# Patient Record
Sex: MALE | Race: WHITE | Employment: OTHER | URBAN - METROPOLITAN AREA
[De-identification: names, ages, dates, MRNs, and addresses within clinical notes are randomized per-mention and may not be internally consistent; named-entity substitution may affect disease eponyms.]

---

## 2017-04-03 PROBLEM — E78.5 HYPERLIPIDEMIA LDL GOAL <100: Status: ACTIVE | Noted: 2017-04-03

## 2017-07-11 ENCOUNTER — HOSPITAL ENCOUNTER (OUTPATIENT)
Dept: LAB | Age: 77
Discharge: HOME | End: 2017-07-11

## 2017-07-11 PROCEDURE — 88305 TISSUE EXAM BY PATHOLOGIST: CPT | Performed by: INTERNAL MEDICINE

## 2018-03-10 ENCOUNTER — HOSPITAL ENCOUNTER (EMERGENCY)
Age: 78
Discharge: HOME | End: 2018-03-10
Attending: EMERGENCY MEDICINE
Payer: MEDICARE

## 2018-03-10 ENCOUNTER — APPOINTMENT (OUTPATIENT)
Dept: GENERAL RADIOLOGY | Age: 78
End: 2018-03-10
Attending: EMERGENCY MEDICINE
Payer: MEDICARE

## 2018-03-10 VITALS
DIASTOLIC BLOOD PRESSURE: 79 MMHG | OXYGEN SATURATION: 96 % | TEMPERATURE: 97.6 F | BODY MASS INDEX: 27.3 KG/M2 | SYSTOLIC BLOOD PRESSURE: 145 MMHG | HEART RATE: 63 BPM | HEIGHT: 71 IN | WEIGHT: 195 LBS | RESPIRATION RATE: 18 BRPM

## 2018-03-10 DIAGNOSIS — R07.9 CHEST PAIN, UNSPECIFIED TYPE: Primary | ICD-10-CM

## 2018-03-10 LAB
ALBUMIN SERPL-MCNC: 4.1 G/DL (ref 3.2–4.6)
ALBUMIN/GLOB SERPL: 1.2 {RATIO} (ref 1.2–3.5)
ALP SERPL-CCNC: 86 U/L (ref 50–136)
ALT SERPL-CCNC: 28 U/L (ref 12–65)
ANION GAP SERPL CALC-SCNC: 8 MMOL/L (ref 7–16)
AST SERPL-CCNC: 24 U/L (ref 15–37)
ATRIAL RATE: 67 BPM
BASOPHILS # BLD: 0 K/UL (ref 0–0.2)
BASOPHILS NFR BLD: 1 % (ref 0–2)
BILIRUB SERPL-MCNC: 0.8 MG/DL (ref 0.2–1.1)
BUN SERPL-MCNC: 24 MG/DL (ref 8–23)
CALCIUM SERPL-MCNC: 9 MG/DL (ref 8.3–10.4)
CALCULATED P AXIS, ECG09: 38 DEGREES
CALCULATED R AXIS, ECG10: 47 DEGREES
CALCULATED T AXIS, ECG11: 58 DEGREES
CHLORIDE SERPL-SCNC: 104 MMOL/L (ref 98–107)
CO2 SERPL-SCNC: 27 MMOL/L (ref 21–32)
CREAT SERPL-MCNC: 1.57 MG/DL (ref 0.8–1.5)
DIAGNOSIS, 93000: NORMAL
DIFFERENTIAL METHOD BLD: ABNORMAL
EOSINOPHIL # BLD: 0.4 K/UL (ref 0–0.8)
EOSINOPHIL NFR BLD: 5 % (ref 0.5–7.8)
ERYTHROCYTE [DISTWIDTH] IN BLOOD BY AUTOMATED COUNT: 13.8 % (ref 11.9–14.6)
GLOBULIN SER CALC-MCNC: 3.5 G/DL (ref 2.3–3.5)
GLUCOSE SERPL-MCNC: 98 MG/DL (ref 65–100)
HCT VFR BLD AUTO: 44.5 % (ref 41.1–50.3)
HGB BLD-MCNC: 15.7 G/DL (ref 13.6–17.2)
IMM GRANULOCYTES # BLD: 0 K/UL (ref 0–0.5)
IMM GRANULOCYTES NFR BLD AUTO: 0 % (ref 0–5)
LYMPHOCYTES # BLD: 2.7 K/UL (ref 0.5–4.6)
LYMPHOCYTES NFR BLD: 34 % (ref 13–44)
MCH RBC QN AUTO: 32.2 PG (ref 26.1–32.9)
MCHC RBC AUTO-ENTMCNC: 35.3 G/DL (ref 31.4–35)
MCV RBC AUTO: 91.4 FL (ref 79.6–97.8)
MONOCYTES # BLD: 0.6 K/UL (ref 0.1–1.3)
MONOCYTES NFR BLD: 8 % (ref 4–12)
NEUTS SEG # BLD: 4.3 K/UL (ref 1.7–8.2)
NEUTS SEG NFR BLD: 52 % (ref 43–78)
P-R INTERVAL, ECG05: 204 MS
PLATELET # BLD AUTO: 189 K/UL (ref 150–450)
PMV BLD AUTO: 11 FL (ref 10.8–14.1)
POTASSIUM SERPL-SCNC: 4.1 MMOL/L (ref 3.5–5.1)
PROT SERPL-MCNC: 7.6 G/DL (ref 6.3–8.2)
Q-T INTERVAL, ECG07: 442 MS
QRS DURATION, ECG06: 136 MS
QTC CALCULATION (BEZET), ECG08: 459 MS
RBC # BLD AUTO: 4.87 M/UL (ref 4.23–5.67)
SODIUM SERPL-SCNC: 139 MMOL/L (ref 136–145)
TROPONIN I BLD-MCNC: 0 NG/ML (ref 0.02–0.05)
TROPONIN I SERPL-MCNC: <0.02 NG/ML (ref 0.02–0.05)
VENTRICULAR RATE, ECG03: 65 BPM
WBC # BLD AUTO: 7.9 K/UL (ref 4.3–11.1)

## 2018-03-10 PROCEDURE — 84484 ASSAY OF TROPONIN QUANT: CPT | Performed by: EMERGENCY MEDICINE

## 2018-03-10 PROCEDURE — 93005 ELECTROCARDIOGRAM TRACING: CPT | Performed by: EMERGENCY MEDICINE

## 2018-03-10 PROCEDURE — 85025 COMPLETE CBC W/AUTO DIFF WBC: CPT | Performed by: EMERGENCY MEDICINE

## 2018-03-10 PROCEDURE — 71046 X-RAY EXAM CHEST 2 VIEWS: CPT

## 2018-03-10 PROCEDURE — 80053 COMPREHEN METABOLIC PANEL: CPT | Performed by: EMERGENCY MEDICINE

## 2018-03-10 PROCEDURE — 99283 EMERGENCY DEPT VISIT LOW MDM: CPT | Performed by: EMERGENCY MEDICINE

## 2018-03-10 NOTE — ED PROVIDER NOTES
HPI Comments: 69-year-old male with history of AAA and coronary artery disease with 2 stents, one most recently placed in December at [facility], presents with constant left-sided chest pain since last night. He states the pain was there when he went to bed and also there when he woke up. No change with movement, exertion, palpation. No cough or shortness of breath. No fever or injury. States it is [place]" in nature. Denies any leg swelling. Took one nitroglycerin without improvement. Has taken aspirin and Plavix this morning. Patient is a 65 y.o. male presenting with chest pain. The history is provided by the patient. Chest Pain (Angina)    Pertinent negatives include no abdominal pain, no back pain, no cough, no fever, no headaches, no nausea, no palpitations, no shortness of breath, no vomiting and no weakness.         Past Medical History:   Diagnosis Date    AAA (abdominal aortic aneurysm) ([facility] [address] 09/12/2016    2.94 cm    Atherosclerotic plaque 09/12/2016    Bilateral Carotid Arteries    BPH with obstruction/lower urinary tract symptoms     CAD (coronary artery disease)     CKD (chronic kidney disease) stage 3, GFR 30-59 ml/min     DDD (degenerative disc disease), cervical     Per MRI    Erectile dysfunction     HTN (hypertension)     Hypercholesterolemia     Left ventricular hypertrophy 09/12/2016    Per ECHO    Peripheral arterial disease ([address] 09/12/2016    Nonobstructive       Past Surgical History:   Procedure Laterality Date    HX HEART CATHETERIZATION  2009    Stent x 1    HX OTHER SURGICAL  1949    Rectal Repair from Accident    HX TONSIL AND ADENOIDECTOMY           Family History:   Problem Relation Age of Onset    Stroke Father 61    Other Brother      Carotid Artery Stenosis    Heart Disease Mother        Social History     Social History    Marital status:      Spouse name: N/A    Number of children: N/A    Years of education: N/A     Occupational History    Not on file. Social History Main Topics    Smoking status: Never Smoker    Smokeless tobacco: Never Used    Alcohol use 1.2 oz/week     2 Cans of beer per week    Drug use: No    Sexual activity: Not on file     Other Topics Concern    Not on file     Social History Narrative         ALLERGIES: Review of patient's allergies indicates no known allergies. Review of Systems   Constitutional: Negative for chills and fever. HENT: Negative for hearing loss. Eyes: Negative for visual disturbance. Respiratory: Negative for cough and shortness of breath. Cardiovascular: Positive for chest pain. Negative for palpitations and leg swelling. Gastrointestinal: Negative for abdominal pain, diarrhea, nausea and vomiting. Musculoskeletal: Positive for arthralgias. Negative for back pain. Skin: Negative for rash. Neurological: Negative for weakness and headaches. Psychiatric/Behavioral: Negative for confusion. Vitals:    03/10/18 1241   BP: 138/77   Pulse: 68   Resp: 18   Temp: 97.6 °F (36.4 °C)   SpO2: 96%   Weight: 88.5 kg (195 lb)   Height: 5' 11\" (1.803 m)            Physical Exam   Constitutional: He appears well-developed and well-nourished. HENT:   Head: Normocephalic and atraumatic. Right Ear: External ear normal.   Left Ear: External ear normal.   Nose: Nose normal.   Mouth/Throat: Oropharynx is clear and moist.   Eyes: Conjunctivae are normal. Pupils are equal, round, and reactive to light. Neck: Normal range of motion. Neck supple. Cardiovascular: Regular rhythm, normal heart sounds and intact distal pulses. Pulmonary/Chest: Effort normal and breath sounds normal. No respiratory distress. He has no wheezes. He exhibits no tenderness. Abdominal: Soft. Bowel sounds are normal. He exhibits no distension. There is no tenderness. Musculoskeletal: Normal range of motion. He exhibits no edema. Neurological: He is alert. Skin: Skin is warm and dry.    Psychiatric: Judgment normal.   Nursing note and vitals reviewed. MDM  Number of Diagnoses or Management Options  Diagnosis management comments: Parts of this document were created using dragon voice recognition software. The chart has been reviewed but errors may still be present. Lungs clear. Well-appearing. Will check serial troponins. 3:11 PM  Second troponin normal.  No change in EKG from prior. Atypical constant pain with 2 normal troponins. Advise follow-up with cardiology. I discussed the results of all labs, procedures, radiographs, and treatments with the patient and available family. Treatment plan is agreed upon and the patient is ready for discharge. Questions about treatment in the ED and differential diagnosis of presenting condition were answered. Patient was given verbal discharge instructions including, but not limited to, importance of returning to the emergency department for any concern of worsening or continued symptoms. Instructions were given to follow up with a primary care provider or specialist within 1-2 days. Adverse effects of medications, if prescribed, were discussed and patient was advised to refrain from significant physical activity until followed up by primary care physician and to not drive or operate heavy machinery after taking any sedating substances.            Amount and/or Complexity of Data Reviewed  Clinical lab tests: ordered and reviewed (Results for orders placed or performed during the hospital encounter of 03/10/18  -CBC WITH AUTOMATED DIFF       Result                                            Value                         Ref Range                       WBC                                               7.9                           4.3 - 11.1 K/uL                 RBC                                               4.87                          4.23 - 5.67 M/uL                HGB                                               15.7                          13.6 - 17.2 g/dL                HCT                                               44.5                          41.1 - 50.3 %                   MCV                                               91.4                          79.6 - 97.8 FL                  MCH                                               32.2                          26.1 - 32.9 PG                  MCHC                                              35.3 (H)                      31.4 - 35.0 g/dL                RDW                                               13.8                          11.9 - 14.6 %                   PLATELET                                          189                           150 - 450 K/uL                  MPV                                               11.0                          10.8 - 14.1 FL                  DF                                                AUTOMATED                                                     NEUTROPHILS                                       52                            43 - 78 %                       LYMPHOCYTES                                       34                            13 - 44 %                       MONOCYTES                                         8                             4.0 - 12.0 %                    EOSINOPHILS                                       5                             0.5 - 7.8 %                     BASOPHILS                                         1                             0.0 - 2.0 %                     IMMATURE GRANULOCYTES                             0                             0.0 - 5.0 %                     ABS. NEUTROPHILS                                  4.3                           1.7 - 8.2 K/UL                  ABS. LYMPHOCYTES                                  2.7                           0.5 - 4.6 K/UL                  ABS. MONOCYTES                                    0.6                           0.1 - 1.3 K/UL                  ABS.  EOSINOPHILS 0.4                           0.0 - 0.8 K/UL                  ABS. BASOPHILS                                    0.0                           0.0 - 0.2 K/UL                  ABS. IMM.  GRANS.                                  0.0                           0.0 - 0.5 K/UL             -METABOLIC PANEL, COMPREHENSIVE       Result                                            Value                         Ref Range                       Sodium                                            139                           136 - 145 mmol/L                Potassium                                         4.1                           3.5 - 5.1 mmol/L                Chloride                                          104                           98 - 107 mmol/L                 CO2                                               27                            21 - 32 mmol/L                  Anion gap                                         8                             7 - 16 mmol/L                   Glucose                                           98                            65 - 100 mg/dL                  BUN                                               24 (H)                        8 - 23 MG/DL                    Creatinine                                        1.57 (H)                      0.8 - 1.5 MG/DL                 GFR est AA                                        55 (L)                        >60 ml/min/1.73m2               GFR est non-AA                                    46 (L)                        >60 ml/min/1.73m2               Calcium                                           9.0                           8.3 - 10.4 MG/DL                Bilirubin, total                                  0.8                           0.2 - 1.1 MG/DL                 ALT (SGPT)                                        28                            12 - 65 U/L                     AST (SGOT) 24                            15 - 37 U/L                     Alk. phosphatase                                  86                            50 - 136 U/L                    Protein, total                                    7.6                           6.3 - 8.2 g/dL                  Albumin                                           4.1                           3.2 - 4.6 g/dL                  Globulin                                          3.5                           2.3 - 3.5 g/dL                  A-G Ratio                                         1.2                           1.2 - 3.5                  -TROPONIN I       Result                                            Value                         Ref Range                       Troponin-I, Qt.                                   <0.02 (L)                     0.02 - 0.05 NG/ML          -POC TROPONIN-I       Result                                            Value                         Ref Range                       Troponin-I (POC)                                  0 (L)                         0.02 - 0.05 ng/ml          )  Tests in the radiology section of CPT®: ordered and reviewed (Xr Chest Pa Lat    Result Date: 3/10/2018  Chest X-ray INDICATION:  Acute onset chest pain PA and lateral views of the chest were obtained. FINDINGS: The lungs are clear. There are no infiltrates or effusions. The heart size is normal.  The bony thorax is intact.       IMPRESSION: No acute findings in the chest     )          ED Course       Procedures

## 2018-03-10 NOTE — ED TRIAGE NOTES
Pt states onset of chest pain was last night. Pt states he took a nitro around 11am today. Pt states the nitro made him feel worse. Pt states having a flare of gout on the right ankle.

## 2018-03-10 NOTE — DISCHARGE INSTRUCTIONS
Chest Pain: Care Instructions  Your Care Instructions    There are many things that can cause chest pain. Some are not serious and will get better on their own in a few days. But some kinds of chest pain need more testing and treatment. Your doctor may have recommended a follow-up visit in the next 8 to 12 hours. If you are not getting better, you may need more tests or treatment. Even though your doctor has released you, you still need to watch for any problems. The doctor carefully checked you, but sometimes problems can develop later. If you have new symptoms or if your symptoms do not get better, get medical care right away. If you have worse or different chest pain or pressure that lasts more than 5 minutes or you passed out (lost consciousness), call 911 or seek other emergency help right away. A medical visit is only one step in your treatment. Even if you feel better, you still need to do what your doctor recommends, such as going to all suggested follow-up appointments and taking medicines exactly as directed. This will help you recover and help prevent future problems. How can you care for yourself at home? · Rest until you feel better. · Take your medicine exactly as prescribed. Call your doctor if you think you are having a problem with your medicine. · Do not drive after taking a prescription pain medicine. When should you call for help? Call 911 if:  ? · You passed out (lost consciousness). ? · You have severe difficulty breathing. ? · You have symptoms of a heart attack. These may include:  ¨ Chest pain or pressure, or a strange feeling in your chest.  ¨ Sweating. ¨ Shortness of breath. ¨ Nausea or vomiting. ¨ Pain, pressure, or a strange feeling in your back, neck, jaw, or upper belly or in one or both shoulders or arms. ¨ Lightheadedness or sudden weakness. ¨ A fast or irregular heartbeat.   After you call 911, the  may tell you to chew 1 adult-strength or 2 to 4 low-dose aspirin. Wait for an ambulance. Do not try to drive yourself. ?Call your doctor today if:  ? · You have any trouble breathing. ? · Your chest pain gets worse. ? · You are dizzy or lightheaded, or you feel like you may faint. ? · You are not getting better as expected. ? · You are having new or different chest pain. Where can you learn more? Go to http://taco-usman.info/. Enter A120 in the search box to learn more about \"Chest Pain: Care Instructions. \"  Current as of: March 20, 2017  Content Version: 11.4  © 8825-5981 [employer]. Care instructions adapted under license by [employer] (which disclaims liability or warranty for this information). If you have questions about a medical condition or this instruction, always ask your healthcare professional. [address] any warranty or liability for your use of this information.

## 2019-09-30 PROCEDURE — 88305 TISSUE EXAM BY PATHOLOGIST: CPT

## 2019-10-01 ENCOUNTER — HOSPITAL ENCOUNTER (OUTPATIENT)
Dept: LAB | Age: 79
Discharge: HOME | End: 2019-10-01

## 2019-10-31 ENCOUNTER — HOSPITAL ENCOUNTER (OUTPATIENT)
Dept: MRI IMAGING | Age: 79
Discharge: HOME | End: 2019-10-31
Attending: UROLOGY
Payer: MEDICARE

## 2019-10-31 ENCOUNTER — HOSPITAL ENCOUNTER (OUTPATIENT)
Dept: NUCLEAR MEDICINE | Age: 79
Discharge: HOME | End: 2019-10-31
Attending: UROLOGY
Payer: MEDICARE

## 2019-10-31 DIAGNOSIS — C61 PROSTATE CANCER (HCC): ICD-10-CM

## 2019-10-31 PROCEDURE — 72197 MRI PELVIS W/O & W/DYE: CPT

## 2019-10-31 PROCEDURE — 74011000258 HC RX REV CODE- 258: Performed by: UROLOGY

## 2019-10-31 PROCEDURE — A9575 INJ GADOTERATE MEGLUMI 0.1ML: HCPCS | Performed by: UROLOGY

## 2019-10-31 PROCEDURE — 74011250636 HC RX REV CODE- 250/636: Performed by: UROLOGY

## 2019-10-31 PROCEDURE — 78306 BONE IMAGING WHOLE BODY: CPT

## 2019-10-31 RX ORDER — SODIUM CHLORIDE 0.9 % (FLUSH) 0.9 %
10 SYRINGE (ML) INJECTION
Status: COMPLETED | OUTPATIENT
Start: 2019-10-31 | End: 2019-10-31

## 2019-10-31 RX ORDER — GADOTERATE MEGLUMINE 376.9 MG/ML
17 INJECTION INTRAVENOUS
Status: COMPLETED | OUTPATIENT
Start: 2019-10-31 | End: 2019-10-31

## 2019-10-31 RX ADMIN — Medication 10 ML: at 10:41

## 2019-10-31 RX ADMIN — Medication 10 ML: at 11:27

## 2019-10-31 RX ADMIN — SODIUM CHLORIDE 100 ML: 900 INJECTION, SOLUTION INTRAVENOUS at 11:27

## 2019-10-31 RX ADMIN — GADOTERATE MEGLUMINE 17 ML: 376.9 INJECTION INTRAVENOUS at 11:27

## 2019-11-01 NOTE — PROGRESS NOTES
I reviewed with Dr. Glen Buckner. Please set him up for plain films of lower thoracic/upper lumbar spine early next wk for abnl bone scan.  I reviewed plan with patient

## 2019-11-04 ENCOUNTER — HOSPITAL ENCOUNTER (OUTPATIENT)
Dept: GENERAL RADIOLOGY | Age: 79
Discharge: HOME | End: 2019-11-04
Payer: MEDICARE

## 2019-11-04 DIAGNOSIS — C61 PROSTATE CANCER (HCC): ICD-10-CM

## 2019-11-04 PROCEDURE — 72100 X-RAY EXAM L-S SPINE 2/3 VWS: CPT

## 2019-11-04 PROCEDURE — 72072 X-RAY EXAM THORAC SPINE 3VWS: CPT

## 2019-11-21 PROBLEM — C61 PROSTATE CANCER (HCC): Status: ACTIVE | Noted: 2019-11-21

## 2019-11-21 NOTE — CONSULTS
Patient: Bessy Bailey MRN: [id number]  SSN: [id number]    YOB: 1940  Age: [de-identified] y.o. 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 PSA 9.7 (Density 0.21), [id number]. PREVIOUS TREATMENT:  1)  TRUS Biopsy 9/30/19    HISTORY OF PRESENT ILLNESS:  Bessy Bailey is a [de-identified] y.o. male who I am seeing at the request of Dr. Librado Aguilera for prostate cancer. He was seen and evaluated for an elevated PSA on routine screening. He did have a prior negative biopsy in [place] in 2012. His pertinent past medical history includes Parkinson's disease, coronary artery disease, cervical disc disease, hypercholesterolemia, hypertension, and peripheral arterial disease. Despite Parkinson's, he is very functional.      PSA History:    4.0 on 4/24/14  5.4 on 11/17/14  4.8 on 8/27/15  7.1 on 9/1/16  6.0 on 10/4/17  9.5 on 10/4/18  8.4 on 1/24/19  9.7 on 8/8/19  Doubling time 4.6 years    Biopsy Results:  9/30/19.  46 cc gland  Michelle 4+4=8 in a RM core, 4+3=7 in 6 cores, 3+4=7 in a RB core and 3+3 in two L sided cores (LM, LA).  10/12 cores. He was ultimately seen back in the urology office to discuss the results of the biopsy and management options. MRI was completed 10/31/2019 showing bilateral peripheral zone lesions without extracapsular extension, seminal vesicle invasion, or lymphadenopathy, [place] 4. Bone scan showed some uptake in T12 but plain films of the area suggested an old compression fracture. Both radiation and surgical options were discussed and he has been referred to me to further discuss radiation as an option for management of his prostate cancer. He was accompanied by his wife at initial consultation. GENITOURINARY REVIEW OF SYSTEMS:  An EPIC 26 was completed pretreatment. His AUA score was 5.       PAST MEDICAL HISTORY:    Past Medical History:   Diagnosis Date    AAA (abdominal aortic aneurysm) ([facility] [address] 09/12/2016    2.94 cm    Atherosclerotic plaque 09/12/2016    Bilateral Carotid Arteries    BPH with obstruction/lower urinary tract symptoms     CAD (coronary artery disease)     CKD (chronic kidney disease) stage 3, GFR 30-59 ml/min ([facility]     DDD (degenerative disc disease), cervical     Per MRI    Erectile dysfunction     HTN (hypertension)     Hypercholesterolemia     Left ventricular hypertrophy 09/12/2016    Per ECHO    Parkinson's disease ([facility] [address]     Peripheral arterial disease ([facility] [address] 09/12/2016    Nonobstructive       The patient denies history of collagen vascular diseases, pacemaker insertion, prior radiation or prior chemotherapy. PAST SURGICAL HISTORY:   Past Surgical History:   Procedure Laterality Date    HX COLONOSCOPY  07/11/2017    12 mm sessile polyp - mixed tubulovillous adenoma    HX CORONARY STENT PLACEMENT  12/21/2017    Proximal RCA    HX HEART CATHETERIZATION  2009    Stent x 1    HX HEART CATHETERIZATION  12/21/2017    PCI - Proximal RCA, Distal LAD Not Amenable to PCI    HX OTHER SURGICAL  1949    Rectal Repair from Accident    HX TONSIL AND ADENOIDECTOMY         MEDICATIONS:     Current Outpatient Medications:     ubidecarenone (COQ-10 PO), Take 400 mg by mouth daily. , Disp: , Rfl:     cyanocobalamin, vitamin B-12, (VITAMIN B12 PO), Take 2,500 mcg by mouth daily. , Disp: , Rfl:     nitroglycerin (NITROSTAT) 0.3 mg SL tablet, 0.3 mg by SubLINGual route., Disp: , Rfl:     rosuvastatin (CRESTOR) 20 mg tablet, Take 1 Tab by mouth nightly., Disp: 90 Tab, Rfl: 1    allopurinol (ZYLOPRIM) 100 mg tablet, Take 1 Tab by mouth daily. , Disp: 90 Tab, Rfl: 1    olmesartan-hydroCHLOROthiazide (BENICAR HCT) 40-12.5 mg per tablet, Take 1 Tab by mouth daily. , Disp: 90 Tab, Rfl: 1    varicella-zoster recombinant, PF, (SHINGRIX, PF,) 50 mcg/0.5 mL susr injection, 0.5mL by IntraMUSCular route once now and then repeat in 2-6 months, Disp: 0.5 mL, Rfl: 1    carbidopa-levodopa (SINEMET)  mg per tablet, Take 2 Tabs by mouth three (3) times daily. , Disp: , Rfl:     piroxicam (FELDENE) 10 mg capsule, Take 1 Cap by mouth daily. (Patient taking differently: Take 10 mg by mouth daily as needed.), Disp: 30 Cap, Rfl: 2    pyridoxine, vitamin B6, (VITAMIN B-6) 100 mg tablet, Take 100 mg by mouth daily. , Disp: , Rfl:     sildenafil citrate (VIAGRA) 100 mg tablet, Take 1 Tab by mouth as needed. , Disp: 12 Tab, Rfl: 3    aspirin (ASPIRIN) 325 mg tablet, Take 325 mg by mouth daily. Alternates weeks with Plavix, Disp: , Rfl:     OTHER, daily. Cholest Off  450 mg, Disp: , Rfl:     selenium 200 mcg tab, Take  by mouth daily. , Disp: , Rfl:     ginkgo biloba 120 mg tab, Take  by mouth daily. , Disp: , Rfl:     FOLIC ACID PO, Take 473 mcg by mouth daily. , Disp: , Rfl:     vitamin E (AQUA GEMS) 400 unit capsule, Take 400 Units by mouth daily. , Disp: , Rfl:     krill-omega-3-dha-epa-lipids (KRILL OIL) 912-52-29-50 mg cap, Take 1 Cap by mouth daily. , Disp: , Rfl:     saw palmetto 160 mg cap, Take 320 mg by mouth daily. , Disp: , Rfl:     FERROUS FUMARATE/VIT BCOMP&C (SUPER B COMPLEX PO), Take 1 Tab by mouth daily. , Disp: , Rfl:     magnesium oxide (MAG-OX) 400 mg tablet, Take 400 mg by mouth daily. , Disp: , Rfl:     zinc 50 mg capsule, Take 50 mg by mouth daily. , Disp: , Rfl:     ALLERGIES:   No Known Allergies    SOCIAL HISTORY:   Social History     Socioeconomic History    Marital status:      Spouse name: Not on file    Number of children: Not on file    Years of education: Not on file    Highest education level: Not on file   Occupational History    Not on file   Social Needs    Financial resource strain: Not on file    Food insecurity:     Worry: Not on file     Inability: Not on file    Transportation needs:     Medical: Not on file     Non-medical: Not on file   Tobacco Use    Smoking status: Never Smoker    Smokeless tobacco: Never Used Substance and Sexual Activity    Alcohol use: Yes     Alcohol/week: 8.0 standard drinks     Types: 8 Cans of beer per week    Drug use: No    Sexual activity: Not on file   Lifestyle    Physical activity:     Days per week: Not on file     Minutes per session: Not on file    Stress: Not on file   Relationships    Social connections:     Talks on phone: Not on file     Gets together: Not on file     Attends Hinduism service: Not on file     Active member of club or organization: Not on file     Attends meetings of clubs or organizations: Not on file     Relationship status: Not on file    Intimate partner violence:     Fear of current or ex partner: Not on file     Emotionally abused: Not on file     Physically abused: Not on file     Forced sexual activity: Not on file   Other Topics Concern    Not on file   Social History Narrative    Not on file       FAMILY HISTORY:   Family History   Problem Relation Age of Onset    Stroke Father 61    Other Brother         Carotid Artery Stenosis    Alzheimer Brother     Heart Disease Mother        REVIEW OF SYSTEMS: Please see the completed review of systems sheet in the chart that I have reviewed today. PHYSICAL EXAMINATION:   ECOG Performance status 0  VITAL SIGNS:   Visit Vitals  BP (!) 152/91 (BP 1 Location: Left arm, BP Patient Position: Sitting)   Pulse 71   Temp 98.9 °F (37.2 °C)   Wt 90.8 kg (200 lb 3.2 oz)   SpO2 94%   BMI 27.92 kg/m²        GENERAL: The patient is well-developed, ambulatory, alert and in no acute distress. HEENT: Head is normocephalic, atraumatic. Pupils are equal, round and reactive to light and accommodation. Extraocular movement intact. Hearing is intact bilaterally to finger rub. Oral cavity reveals no lesions. Mucous membranes are moist. NECK: Neck is supple with no masses. CARDIOVASCULAR: Heart is regular rate and rhythm. There are no murmurs rubs or gallups.  Radial pulses are 2+ RESPIRATORY: Lungs are clear to auscultation and percussion. There is normal respiratory effort. GASTROINTESTINAL: The abdomen is soft, non-tender, nondistended with no hepatospelnomagaly. Digital rectal examination: No evidence of internal or external hemorrhoids, clear rectal vault without palpable masses, smooth prostate gland with no nodularity. LYMPHATIC: There is no cervical, supraclavicular or axillary lymphadenopathy bilaterally. MUSCULOSKELETAL: Extremities reveal no cyanosis, clubbing or edema.  is 5+/5. NEURO:  Cranial nerves II-XII grossly intact. Muscular strength and sensation are intact throughout all four extremities. PATHOLOGY:    Please see HPI for details of TRUS Biopsy. LABORATORY:   Lab Results   Component Value Date/Time    Sodium 139 09/16/2019 10:10 AM    Potassium 4.7 09/16/2019 10:10 AM    Chloride 100 09/16/2019 10:10 AM    CO2 25 09/16/2019 10:10 AM    Anion gap 8 03/10/2018 12:47 PM    Glucose 83 09/16/2019 10:10 AM    BUN 17 09/16/2019 10:10 AM    Creatinine 1.31 (H) 09/16/2019 10:10 AM    GFR est AA 59 (L) 09/16/2019 10:10 AM    GFR est non-AA 51 (L) 09/16/2019 10:10 AM    Calcium 9.1 09/16/2019 10:10 AM    Albumin 4.3 09/16/2019 10:10 AM    Protein, total 6.9 09/16/2019 10:10 AM    Globulin 3.5 03/10/2018 12:47 PM    A-G Ratio 1.7 09/16/2019 10:10 AM    AST (SGOT) 26 09/16/2019 10:10 AM    ALT (SGPT) 20 09/16/2019 10:10 AM     Lab Results   Component Value Date/Time    WBC 9.1 09/16/2019 10:10 AM    HGB 15.8 09/16/2019 10:10 AM    HCT 46.8 09/16/2019 10:10 AM    PLATELET 757 98/41/9529 10:10 AM       Please see HPI for PSA History. RADIOLOGY:    Nm Bone Scan Wh Body    Result Date: 10/31/2019  EXAM: Whole-body bone scan. INDICATION:  Prostate cancer for staging COMPARISON: Same day pelvis MRI. TECHNIQUE: Whole-body images were obtained after intravenous injection of 26.2 mCi of technetium HDP.  Anterior and posterior imaging from the head to the feet was performed approximately 3 hours after injection of radiotracer. FINDINGS: No focal or regional areas of suspicious radiotracer uptake are demonstrated. Degenerative type uptake of the lumbar spine at L3-4 and in the bilateral knees. Additionally, there is diffuse increased uptake throughout the T12 vertebral body. Focal uptake within the left posterior ninth and 10th ribs, which is favored posttraumatic in etiology. Normal soft tissue and genitourinary tract activity is present. IMPRESSION: 1. Indeterminate diffusely increased uptake at the T12 vertebral body. This level was not evaluated on the same day MRI pelvis. Recommend dedicated MRI thoracolumbar spine to help exclude osseous metastasis. 2.  No suspicious uptake elsewhere concerning for osteoblastic metastatic disease. Xr Spine Thorac 3 V    Result Date: 11/4/2019  THREE-VIEW THORACIC SPINE, THREE-VIEW LUMBAR SPINE: CLINICAL HISTORY:  Follow-up abnormal initial staging bone scan for prostate cancer. COMPARISON:  Bone scan of October 31, 2019. FINDINGS:  AP, lateral, and LS spot views demonstrate mild compression deformity of the body of T12 with mottled lucency but no definite free cortical margin corresponding with markedly increased uptake on the bone scan. There is multilevel thoracolumbar spondylosis, most marked in the lumbar spine. Asymmetric left-sided spurring associated with degenerative disc disease at L3-4 accounts for a smaller focus of increased uptake on the bone scan. Multilevel facet arthritis is also noted. There are extensive atherosclerotic calcifications in the aorta fusiform aneurysm measuring probably 3.2 cm at the L4 level. IMPRESSION:     1. Mild compression deformity of the body of T12 at the level of the bone scan abnormality is associated with mottled lucency and not typical of metastatic prostate cancer. It could represent an insufficiency fracture, but underlying metastasis or lymphoma cannot be excluded.   If further imaging evaluation is clinically indicated at this time, then follow-up MRI of the lumbar spine to include T12 before after contrast would be most useful. 2.  Scoliosis and multilevel spondylosis, most marked in the lumbar spine. 3.  Extensive atherosclerosis with an approximately 3.2 cm infrarenal abdominal aortic aneurysm. Xr Spine Lumb 2 Or 3 V    Result Date: 11/4/2019  THREE-VIEW THORACIC SPINE, THREE-VIEW LUMBAR SPINE: CLINICAL HISTORY:  Follow-up abnormal initial staging bone scan for prostate cancer. COMPARISON:  Bone scan of October 31, 2019. FINDINGS:  AP, lateral, and LS spot views demonstrate mild compression deformity of the body of T12 with mottled lucency but no definite free cortical margin corresponding with markedly increased uptake on the bone scan. There is multilevel thoracolumbar spondylosis, most marked in the lumbar spine. Asymmetric left-sided spurring associated with degenerative disc disease at L3-4 accounts for a smaller focus of increased uptake on the bone scan. Multilevel facet arthritis is also noted. There are extensive atherosclerotic calcifications in the aorta fusiform aneurysm measuring probably 3.2 cm at the L4 level. IMPRESSION:     1. Mild compression deformity of the body of T12 at the level of the bone scan abnormality is associated with mottled lucency and not typical of metastatic prostate cancer. It could represent an insufficiency fracture, but underlying metastasis or lymphoma cannot be excluded. If further imaging evaluation is clinically indicated at this time, then follow-up MRI of the lumbar spine to include T12 before after contrast would be most useful. 2.  Scoliosis and multilevel spondylosis, most marked in the lumbar spine. 3.  Extensive atherosclerosis with an approximately 3.2 cm infrarenal abdominal aortic aneurysm. Mri Pelv W Wo Cont    Result Date: 10/31/2019  MRI OF THE PROSTATE INDICATION: Prostate Cancer Standard MRI sequences were obtained through the pelvis in multiple planes. Images were obtained before and after intravenous infusion of  17 mL of Dotarem. COMPARISON: None available FINDINGS: -PROSTATE SIZE: 5.1 x 3.7 x 4.8 cm -CENTRAL GLAND: Mild central gland hypertrophy. No discrete mass. -PERIPHERAL GLAND: Moderate artifact from postbiopsy hemorrhage. Small areas of abnormal enhancement and restricted diffusion noted in the left mid gland and right apex. -CAPSULE: Capsule appears intact. No perineural tumor. -SEMINAL VESICLES: Normal. -LYMPH NODES: No significant adenopathy. -BONES: No bony lesions. -BLADDER/RECTUM: Sigmoid diverticulosis. No tumor involvement. IMPRESSION: 1.  Multifocal tumor in both sides of the peripheral zone, most prominent in the left mid gland and right apex. No definite extracapsular extension. PI-RADS 4 2. No evidence of adenopathy or metastatic disease in the lower abdomen/pelvis       IMPRESSION:  Roshan Ray is a [de-identified] y.o. male with high risk prostate cancer. The natural history of prostate cancer was reviewed with the patient. Prognostic features including stage, Michelle score, age, race, and PSA were reviewed. Treatment options for prostate cancer including active surveillance, hormonal therapy, radiotherapy, and surgery were compared and contrasted with regard to outcome and quality of life. I discussed the radiotherapy options including low-dose rate brachytherapy, high-dose-rate brachytherapy, and external beam radiation therapy. We also discussed the addition of hormone therapy. Side effects and complications of radiation therapy including fatigue, urinary obstructive symptoms, rectal irritation and bleeding, and decline of sexual function were among the complications highlighted. He asked many questions which were answered to his satisfaction.      For intermediate risk disease, there are several viable treatment options include active surveillance which includes delaying definitive therapy until which time the disease progresses, or the patient is no longer interstitial in this option. At this time surgery with radical prostatectomy, or IMRT alone or in combination with a short course of androgen deprivation therapy (6 months with hormones started 2 months prior to starting the radiation) are appropriate treatment options. I advised the patient each of these options offer similar tumor control with differences found mainly in varying toxicity profiles. For high risk disease, there are several viable treatment options but I generally do not advise active surveillance. Surgery with radical prostatectomy or IMRT examination with a protracted course of androgen deprivation therapy ( traditionally 2-3 years) are appropriate options. There is however data in support of use of 18 months of androgen deprivation therapy (compared to 36 months) based on a study published in abstract format at [facility] 2017 from a randomized phase III trial where cancer outcomes were similar but imrproved quality of life was noted in the shorter ADT group. I advised the patient each of these options offer similar tumor contol with differences found mainly in varying toxicity profiles. IMRT is given as a single modality treatment or as initial treatment prior to an HDR boost.  A total of 45 Gy would be delivered to the pelvis and prostate gland followed by a 15 gray in 1 fraction HDR boost to the prostate gland alone. For patients who are not candidates for brachytherapy either by poor anatomy meaning substantial arch interference or a prostate gland outside of 20-60 cc where good dosimetry is often not achievable, IMRT is given for the entirety of the treatment course. Furthermore, for low or intermediate risk, there is now substantial data in support of hypo-fractionated radiation.   Commonly a dose greater than 2 Gy per fraction, such as 2.5 Gy as given in numerous clinical trials including RTOG 0415, to a total dose of 70 Gy offers similar toxicity outcomes and is found to be non-inferior to standard fractionated radiation and therefore more logistically appealing. He does have fairly high volume disease, although only a single core of [place] 8. This does however qualify him for high risk disease. Luckily there was no sign of extracapsular extension or seminal vesicle involvement, nor adenopathy, on his MRI and his bone scan was negative. Therefore does have organ confined disease. With high risk disease, commonly treatment is indicated. While he has several medical issues, none are progressive and therefore he does have more than a five-year life expectancy. For that reason I would advocate for treatment. I do feel a short course of androgen deprivation would be warranted, 6 months. He was counseled on the potential side effects and was agreeable. I would then combine this with either HDR or hypofractionated radiation. Considering his age and comorbidities, I think sparing him HDR would be reasonable and therefore I feel like the 28 fraction regimen would be most appropriate. He was in agreement. I would like his case presented in conference before proceeding abut will go ahead and start him on hormone therapy with a 6 month Lupron injection. We also discussed the new and emerging data on SpaceOAR or Augmenix which is a water based gel placed between the rectum and prostate which has been shown in clinical trials to spare GI toxicity. This is placed at time of fiducial markers. We will schedule this for mid Jan then see him back a few weeks following for simulation. PLAN:    1) Discussed viable treatment options focusing on external beam radiation highlighting the risks, benefits, and side effects from treatment. 2) Reviewed available research treatment and cancer care protocols for which patient may be eligible. Unfortunately there are no matching clinical trials available at this time.    3) Patient to begin ADT with planned 6 months of ADT. Start Ca and Vit D. Also schedule SpaceOAR. 4) Patient will be scheduled for follow up in a few weeks after Space OAR for simulation with plans to deliver 70 Gy in 28 fractions assuming the tumor board agrees.      Rebecca Matthews MD   November 22, 2019

## 2019-11-22 ENCOUNTER — HOSPITAL ENCOUNTER (OUTPATIENT)
Dept: RADIATION ONCOLOGY | Age: 79
Discharge: HOME | End: 2019-11-22
Payer: MEDICARE

## 2019-11-22 VITALS
BODY MASS INDEX: 27.92 KG/M2 | HEART RATE: 71 BPM | DIASTOLIC BLOOD PRESSURE: 91 MMHG | SYSTOLIC BLOOD PRESSURE: 152 MMHG | WEIGHT: 200.2 LBS | TEMPERATURE: 98.9 F | OXYGEN SATURATION: 94 %

## 2019-11-22 DIAGNOSIS — C61 PROSTATE CANCER (HCC): Primary | ICD-10-CM

## 2019-11-22 PROCEDURE — 99211 OFF/OP EST MAY X REQ PHY/QHP: CPT

## 2019-11-22 NOTE — NURSE NAVIGATOR
Consult prostate cancer,  Aua/epic completed. Aua score 5. Last psa 8-8-19. Biopsy 9-30-19. MRI pelvis and bone scan 10-31-19. Pt to be scheduled for Space Oar/Fm mid January. Order for Lupron 45mg x one sent to [facility]. See and Sim scheduled post Space Oar.      Alma Askew RN

## 2020-01-16 ENCOUNTER — APPOINTMENT (OUTPATIENT)
Dept: RADIATION ONCOLOGY | Age: 80
End: 2020-01-16

## 2020-02-12 ENCOUNTER — HOSPITAL ENCOUNTER (OUTPATIENT)
Dept: RADIATION ONCOLOGY | Age: 80
Discharge: HOME | End: 2020-02-12
Payer: MEDICARE

## 2020-02-12 VITALS
OXYGEN SATURATION: 96 % | TEMPERATURE: 97.7 F | HEART RATE: 76 BPM | SYSTOLIC BLOOD PRESSURE: 126 MMHG | DIASTOLIC BLOOD PRESSURE: 87 MMHG

## 2020-02-12 DIAGNOSIS — C61 PROSTATE CANCER (HCC): Primary | ICD-10-CM

## 2020-02-12 DIAGNOSIS — C61 PROSTATE CANCER (HCC): ICD-10-CM

## 2020-02-12 LAB — PSA SERPL-MCNC: 0.8 NG/ML

## 2020-02-12 PROCEDURE — 84153 ASSAY OF PSA TOTAL: CPT

## 2020-02-12 PROCEDURE — 84403 ASSAY OF TOTAL TESTOSTERONE: CPT

## 2020-02-12 PROCEDURE — 36415 COLL VENOUS BLD VENIPUNCTURE: CPT

## 2020-02-12 PROCEDURE — 99211 OFF/OP EST MAY X REQ PHY/QHP: CPT

## 2020-02-12 NOTE — NURSE NAVIGATOR
F/u prostate cancer. Lupron 12-2-19. Family states after Lupron, pt's mobility got worse. Some days he could not get out of bed. Sinemet night time dose was increased in early January. Mobility is getting better. Space Oar was cancelled 1-8-20 due to mobility decline. Pt scheduled for labs today. Will be scheduled for 3 month f/u with labs at [facility].     Isidro Roberts RN

## 2020-02-12 NOTE — PROGRESS NOTES
Patient: Carola Guzmán MRN: [id number]  SSN: [id number]    YOB: 1940  Age: [de-identified] y.o. 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 PSA 9.7 (Density 0.21), [id number]. PREVIOUS TREATMENT:  1) TRUS Biopsy 9/30/19  2) 12/2/19:  Lupron 45 mg    HISTORY OF PRESENT ILLNESS:  Carola Guzmán is a [de-identified] y.o. male who I am seeing at the request of Dr. Kalen Bassett for prostate cancer. He was seen and evaluated for an elevated PSA on routine screening. He did have a prior negative biopsy in [place] in 2012. His pertinent past medical history includes Parkinson's disease, coronary artery disease, cervical disc disease, hypercholesterolemia, hypertension, and peripheral arterial disease. Despite Parkinson's, he is very functional.      PSA History:    4.0 on 4/24/14  5.4 on 11/17/14  4.8 on 8/27/15  7.1 on 9/1/16  6.0 on 10/4/17  9.5 on 10/4/18  8.4 on 1/24/19  9.7 on 8/8/19  Doubling time 4.6 years    Biopsy Results:  9/30/19.  46 cc gland  Michelle 4+4=8 in a RM core, 4+3=7 in 6 cores, 3+4=7 in a RB core and 3+3 in two L sided cores (LM, LA).  10/12 cores. He was ultimately seen back in the urology office to discuss the results of the biopsy and management options. MRI was completed 10/31/2019 showing bilateral peripheral zone lesions without extracapsular extension, seminal vesicle invasion, or lymphadenopathy, Pirads 4. Bone scan showed some uptake in T12 but plain films of the area suggested an old compression fracture. Both radiation and surgical options were discussed and he has been referred to me to further discuss radiation as an option for management of his prostate cancer. He was accompanied by his wife at initial consultation. We discussed various options and felt a hypo-fractionated radiation approach combined with 6 months of hormone therapy was most appropriate.   He was presented in conference where the group agreed this was reasonable. To facilitate prompt treatment and because he was decided on how he wanted to proceed, we went ahead and scheduled him for hormone therapy (hormone injectin, Lupron 45 mg, 12/2/19) and for SpaceOAR 1/8/20. Unfortunately shortly after his hormone injection he rapidly declined in functional status. He was a unable to really get out of bed for nearly a week. As a result his family contacted us and we elected to hold any therapy and canceled his hydrogel placement. We tentatively plan for a follow-up to assess his progress and determine whether or not we should proceed and he returned 2/12/2020 to further discuss. While he was working with physical therapy, it appeared he was nearly 75% back to baseline, but no were near 100% based on discussions with him and his family. GENITOURINARY REVIEW OF SYSTEMS:  An EPIC 26 was completed pretreatment. His AUA score was 5.       PAST MEDICAL HISTORY:    Past Medical History:   Diagnosis Date    AAA (abdominal aortic aneurysm) ([address] 09/12/2016    2.94 cm    Atherosclerotic plaque 09/12/2016    Bilateral Carotid Arteries    BPH with obstruction/lower urinary tract symptoms     CAD (coronary artery disease)     CKD (chronic kidney disease) stage 3, GFR 30-59 ml/min ([facility])     DDD (degenerative disc disease), cervical     Per MRI    Erectile dysfunction     HTN (hypertension)     Hypercholesterolemia     Left ventricular hypertrophy 09/12/2016    Per ECHO    Parkinson's disease ([address]     Peripheral arterial disease ([address] 09/12/2016    Nonobstructive           PAST SURGICAL HISTORY:   Past Surgical History:   Procedure Laterality Date    HX COLONOSCOPY  07/11/2017    12 mm sessile polyp - mixed tubulovillous adenoma    HX CORONARY STENT PLACEMENT  12/21/2017    Proximal RCA    HX HEART CATHETERIZATION  2009    Stent x 1    HX HEART CATHETERIZATION  12/21/2017    PCI - Proximal RCA, Distal LAD Not Amenable to PCI    HX OTHER SURGICAL  1949    Rectal Repair from Accident    HX TONSIL AND ADENOIDECTOMY         MEDICATIONS:     Current Outpatient Medications:     allopurinoL (ZYLOPRIM) 100 mg tablet, Take 1 Tab by mouth daily. , Disp: 90 Tab, Rfl: 1    rosuvastatin (CRESTOR) 20 mg tablet, Take 1 Tab by mouth nightly., Disp: 90 Tab, Rfl: 1    carbidopa-levodopa ER (SINEMET CR)  mg per tablet, Take 2 Tabs by mouth nightly., Disp: , Rfl:     ubidecarenone (COQ-10 PO), Take 400 mg by mouth daily. , Disp: , Rfl:     cyanocobalamin, vitamin B-12, (VITAMIN B12 PO), Take 2,500 mcg by mouth daily. , Disp: , Rfl:     nitroglycerin (NITROSTAT) 0.3 mg SL tablet, 0.3 mg by SubLINGual route., Disp: , Rfl:     olmesartan-hydroCHLOROthiazide (BENICAR HCT) 40-12.5 mg per tablet, Take 1 Tab by mouth daily. , Disp: 90 Tab, Rfl: 1    varicella-zoster recombinant, PF, (SHINGRIX, PF,) 50 mcg/0.5 mL susr injection, 0.5mL by IntraMUSCular route once now and then repeat in 2-6 months, Disp: 0.5 mL, Rfl: 1    carbidopa-levodopa (SINEMET)  mg per tablet, Take 2 Tabs by mouth three (3) times daily. , Disp: , Rfl:     piroxicam (FELDENE) 10 mg capsule, Take 1 Cap by mouth daily. (Patient taking differently: Take 10 mg by mouth daily as needed.), Disp: 30 Cap, Rfl: 2    pyridoxine, vitamin B6, (VITAMIN B-6) 100 mg tablet, Take 100 mg by mouth daily. , Disp: , Rfl:     sildenafil citrate (VIAGRA) 100 mg tablet, Take 1 Tab by mouth as needed. , Disp: 12 Tab, Rfl: 3    aspirin (ASPIRIN) 325 mg tablet, Take 325 mg by mouth daily. Alternates weeks with Plavix, Disp: , Rfl:     OTHER, daily. Cholest Off  450 mg, Disp: , Rfl:     selenium 200 mcg tab, Take  by mouth daily. , Disp: , Rfl:     ginkgo biloba 120 mg tab, Take  by mouth daily. , Disp: , Rfl:     FOLIC ACID PO, Take 278 mcg by mouth daily. , Disp: , Rfl:     vitamin E (AQUA GEMS) 400 unit capsule, Take 400 Units by mouth daily. , Disp: , Rfl:    krill-omega-3-dha-epa-lipids (KRILL OIL) 801-92-69-50 mg cap, Take 1 Cap by mouth daily. , Disp: , Rfl:     saw palmetto 160 mg cap, Take 320 mg by mouth daily. , Disp: , Rfl:     FERROUS FUMARATE/VIT BCOMP&C (SUPER B COMPLEX PO), Take 1 Tab by mouth daily. , Disp: , Rfl:     magnesium oxide (MAG-OX) 400 mg tablet, Take 400 mg by mouth daily. , Disp: , Rfl:     zinc 50 mg capsule, Take 50 mg by mouth daily. , Disp: , Rfl:     ALLERGIES:   No Known Allergies    SOCIAL HISTORY:   Social History     Socioeconomic History    Marital status:      Spouse name: Not on file    Number of children: Not on file    Years of education: Not on file    Highest education level: Not on file   Occupational History    Not on file   Social Needs    Financial resource strain: Not on file    Food insecurity:     Worry: Not on file     Inability: Not on file    Transportation needs:     Medical: Not on file     Non-medical: Not on file   Tobacco Use    Smoking status: Never Smoker    Smokeless tobacco: Never Used   Substance and Sexual Activity    Alcohol use:  Yes     Alcohol/week: 8.0 standard drinks     Types: 8 Cans of beer per week    Drug use: No    Sexual activity: Not on file   Lifestyle    Physical activity:     Days per week: Not on file     Minutes per session: Not on file    Stress: Not on file   Relationships    Social connections:     Talks on phone: Not on file     Gets together: Not on file     Attends [employer] service: Not on file     Active member of club or organization: Not on file     Attends meetings of clubs or organizations: Not on file     Relationship status: Not on file    Intimate partner violence:     Fear of current or ex partner: Not on file     Emotionally abused: Not on file     Physically abused: Not on file     Forced sexual activity: Not on file   Other Topics Concern    Not on file   Social History Narrative    Not on file       FAMILY HISTORY:   Family History   Problem Relation Age of Onset    Stroke Father 61    Other Brother         Carotid Artery Stenosis    Alzheimer Brother     Heart Disease Mother          PHYSICAL EXAMINATION:   ECOG Performance status 0  VITAL SIGNS:   Visit Vitals  /87 (BP 1 Location: Left arm, BP Patient Position: Sitting)   Pulse 76   Temp 97.7 °F (36.5 °C)   SpO2 96%        GENERAL: The patient is well-developed, ambulatory, alert and in no acute distress. CARDIOVASCULAR: Heart is regular rate and rhythm. There are no murmurs rubs or gallups. Radial pulses are 2+ RESPIRATORY: Lungs are clear to auscultation and percussion. There is normal respiratory effort. GASTROINTESTINAL: The abdomen is soft, non-tender, nondistended with no hepatospelnomagaly. Digital rectal examination: COPIED FROM PRIOR - No evidence of internal or external hemorrhoids, clear rectal vault without palpable masses, smooth prostate gland with no nodularity. PATHOLOGY:    Please see HPI for details of TRUS Biopsy. LABORATORY:   Lab Results   Component Value Date/Time    Sodium 138 01/15/2020 10:39 AM    Potassium 4.7 01/15/2020 10:39 AM    Chloride 97 01/15/2020 10:39 AM    CO2 24 01/15/2020 10:39 AM    Anion gap 8 03/10/2018 12:47 PM    Glucose 87 01/15/2020 10:39 AM    BUN 16 01/15/2020 10:39 AM    Creatinine 1.26 01/15/2020 10:39 AM    GFR est AA 62 01/15/2020 10:39 AM    GFR est non-AA 54 (L) 01/15/2020 10:39 AM    Calcium 9.7 01/15/2020 10:39 AM    Albumin 3.8 01/15/2020 10:39 AM    Protein, total 6.9 01/15/2020 10:39 AM    Globulin 3.5 03/10/2018 12:47 PM    A-G Ratio 1.2 01/15/2020 10:39 AM    AST (SGOT) 26 01/15/2020 10:39 AM    ALT (SGPT) 14 01/15/2020 10:39 AM     Lab Results   Component Value Date/Time    WBC 9.1 09/16/2019 10:10 AM    HGB 15.8 09/16/2019 10:10 AM    HCT 46.8 09/16/2019 10:10 AM    PLATELET 330 08/69/9407 10:10 AM       Please see HPI for PSA History.     RADIOLOGY:    Nm Bone Scan Wh Body    Result Date: 10/31/2019  EXAM: Whole-body bone scan. INDICATION:  Prostate cancer for staging COMPARISON: Same day pelvis MRI. TECHNIQUE: Whole-body images were obtained after intravenous injection of 26.2 mCi of technetium HDP. Anterior and posterior imaging from the head to the feet was performed approximately 3 hours after injection of radiotracer. FINDINGS: No focal or regional areas of suspicious radiotracer uptake are demonstrated. Degenerative type uptake of the lumbar spine at L3-4 and in the bilateral knees. Additionally, there is diffuse increased uptake throughout the T12 vertebral body. Focal uptake within the left posterior ninth and 10th ribs, which is favored posttraumatic in etiology. Normal soft tissue and genitourinary tract activity is present. IMPRESSION: 1. Indeterminate diffusely increased uptake at the T12 vertebral body. This level was not evaluated on the same day MRI pelvis. Recommend dedicated MRI thoracolumbar spine to help exclude osseous metastasis. 2.  No suspicious uptake elsewhere concerning for osteoblastic metastatic disease. Xr Spine Thorac 3 V    Result Date: 11/4/2019  THREE-VIEW THORACIC SPINE, THREE-VIEW LUMBAR SPINE: CLINICAL HISTORY:  Follow-up abnormal initial staging bone scan for prostate cancer. COMPARISON:  Bone scan of October 31, 2019. FINDINGS:  AP, lateral, and LS spot views demonstrate mild compression deformity of the body of T12 with mottled lucency but no definite free cortical margin corresponding with markedly increased uptake on the bone scan. There is multilevel thoracolumbar spondylosis, most marked in the lumbar spine. Asymmetric left-sided spurring associated with degenerative disc disease at L3-4 accounts for a smaller focus of increased uptake on the bone scan. Multilevel facet arthritis is also noted. There are extensive atherosclerotic calcifications in the aorta fusiform aneurysm measuring probably 3.2 cm at the L4 level. IMPRESSION:     1.   Mild compression deformity of the body of T12 at the level of the bone scan abnormality is associated with mottled lucency and not typical of metastatic prostate cancer. It could represent an insufficiency fracture, but underlying metastasis or lymphoma cannot be excluded. If further imaging evaluation is clinically indicated at this time, then follow-up MRI of the lumbar spine to include T12 before after contrast would be most useful. 2.  Scoliosis and multilevel spondylosis, most marked in the lumbar spine. 3.  Extensive atherosclerosis with an approximately 3.2 cm infrarenal abdominal aortic aneurysm. Xr Spine Lumb 2 Or 3 V    Result Date: 11/4/2019  THREE-VIEW THORACIC SPINE, THREE-VIEW LUMBAR SPINE: CLINICAL HISTORY:  Follow-up abnormal initial staging bone scan for prostate cancer. COMPARISON:  Bone scan of October 31, 2019. FINDINGS:  AP, lateral, and LS spot views demonstrate mild compression deformity of the body of T12 with mottled lucency but no definite free cortical margin corresponding with markedly increased uptake on the bone scan. There is multilevel thoracolumbar spondylosis, most marked in the lumbar spine. Asymmetric left-sided spurring associated with degenerative disc disease at L3-4 accounts for a smaller focus of increased uptake on the bone scan. Multilevel facet arthritis is also noted. There are extensive atherosclerotic calcifications in the aorta fusiform aneurysm measuring probably 3.2 cm at the L4 level. IMPRESSION:     1. Mild compression deformity of the body of T12 at the level of the bone scan abnormality is associated with mottled lucency and not typical of metastatic prostate cancer. It could represent an insufficiency fracture, but underlying metastasis or lymphoma cannot be excluded. If further imaging evaluation is clinically indicated at this time, then follow-up MRI of the lumbar spine to include T12 before after contrast would be most useful.  2.  Scoliosis and multilevel spondylosis, most marked in the lumbar spine. 3.  Extensive atherosclerosis with an approximately 3.2 cm infrarenal abdominal aortic aneurysm. Mri Pelv W Wo Cont    Result Date: 10/31/2019  MRI OF THE PROSTATE INDICATION: Prostate Cancer Standard MRI sequences were obtained through the pelvis in multiple planes. Images were obtained before and after intravenous infusion of  17 mL of Dotarem. COMPARISON: None available FINDINGS: -PROSTATE SIZE: 5.1 x 3.7 x 4.8 cm -CENTRAL GLAND: Mild central gland hypertrophy. No discrete mass. -PERIPHERAL GLAND: Moderate artifact from postbiopsy hemorrhage. Small areas of abnormal enhancement and restricted diffusion noted in the left mid gland and right apex. -CAPSULE: Capsule appears intact. No perineural tumor. -SEMINAL VESICLES: Normal. -LYMPH NODES: No significant adenopathy. -BONES: No bony lesions. -BLADDER/RECTUM: Sigmoid diverticulosis. No tumor involvement. IMPRESSION: 1.  Multifocal tumor in both sides of the peripheral zone, most prominent in the left mid gland and right apex. No definite extracapsular extension. PI-RADS 4 2. No evidence of adenopathy or metastatic disease in the lower abdomen/pelvis       IMPRESSION:  Johnna Sepulveda is a [de-identified] y.o. male with high risk prostate cancer. The natural history of prostate cancer was again reviewed with the patient. Prognostic features including stage, [place] score, age, race, and PSA were reviewed. Treatment options for prostate cancer including active surveillance, hormonal therapy, radiotherapy, and surgery were compared and contrasted with regard to outcome and quality of life. I discussed the radiotherapy options including low-dose rate brachytherapy, high-dose-rate brachytherapy, and external beam radiation therapy. We also discussed the addition of hormone therapy.  Side effects and complications of radiation therapy including fatigue, urinary obstructive symptoms, rectal irritation and bleeding, and decline of sexual function were among the complications highlighted. He does have fairly high volume disease, although only a single core of Michelle 8. This does however qualify him for high risk disease. Luckily there was no sign of extracapsular extension or seminal vesicle involvement, nor adenopathy, on his MRI and his bone scan was negative. Therefore he does have organ confined disease. With high risk disease, commonly treatment is indicated. While he has several medical issues, none are progressive and therefore he does have more than a five-year life expectancy. For that reason I would advocate for treatment. I did feel a short course of androgen deprivation would be warranted, 6 months. He was counseled on the potential side effects and was agreeable. I would then combine this with either HDR or hypofractionated radiation. Considering his age and comorbidities, I felt sparing him HDR would be reasonable and therefore I felt the 28 fraction regimen would be most appropriate. He was in agreement. I wanted his case presented in conference before proceeding but went ahead and started him on hormone therapy with a 6 month Lupron injection. As discussed in the history of present illness, he declined rapidly following this injection and after lengthy discussion with his family, we elected to hold his radiation and canceled his hydrogel placement. Upon follow-up 2/12/2020, he was doing much better, but certainly no were near back to baseline. We discussed the options of either simply no longer following PSA, checking a PSA now and assessing his response to the hormone therapy, or proceeding with radiation. After our discussion we elected to check a PSA and plan for 3 month follow-up with another PSA check assuming it was favorable. I will see him at our [address] [place] [address], [facility] office for this follow-up.     PLAN:    1) Discussed viable treatment options focusing on external beam radiation highlighting the risks, benefits, and side effects from treatment. Elected to defer treatment if and when he recovers functionally. 2) Reviewed available research treatment and cancer care protocols for which patient may be eligible. Unfortunately there are no matching clinical trials available at this time. 3) Patient to have PSA check today and plan for 3 month follow up with repeat PSA check. Portions of this note were copied from prior encounters and reviewed for accuracy, currency, and represent documentation and tasks completed during this encounter. I verify and attest these portions to be unchanged from prior visits.     Nas Sultana MD  02/12/20

## 2020-02-13 LAB — TESTOST SERPL-MCNC: 4 NG/DL (ref 264–916)

## 2020-05-01 PROBLEM — R29.3 POSTURE ABNORMALITY: Status: ACTIVE | Noted: 2019-10-29

## 2020-05-01 PROBLEM — R27.9 LACK OF COORDINATION: Status: ACTIVE | Noted: 2020-02-28

## 2020-05-01 PROBLEM — Z78.9 DECREASED ACTIVITIES OF DAILY LIVING (ADL): Status: ACTIVE | Noted: 2020-02-28

## 2020-05-01 PROBLEM — R41.89 SIGNS AND SYMPTOMS INVOLVING COGNITION: Status: ACTIVE | Noted: 2020-02-28

## 2020-05-01 PROBLEM — R41.840 ATTENTION AND CONCENTRATION DEFICIT: Status: ACTIVE | Noted: 2020-02-28

## 2020-05-01 PROBLEM — R29.6 RECURRENT FALLS: Status: ACTIVE | Noted: 2019-10-29

## 2020-05-01 PROBLEM — I71.40 AAA (ABDOMINAL AORTIC ANEURYSM): Status: ACTIVE | Noted: 2019-04-30

## 2020-05-01 PROBLEM — G20 PARKINSON'S DISEASE (HCC): Status: ACTIVE | Noted: 2019-09-26

## 2020-05-01 PROBLEM — R26.9 ABNORMAL GAIT: Status: ACTIVE | Noted: 2019-10-29

## 2020-05-01 PROBLEM — M25.60 JOINT STIFFNESS OF SPINE: Status: ACTIVE | Noted: 2019-10-29

## 2020-09-24 PROBLEM — D12.6 TUBULOVILLOUS ADENOMA OF COLON: Status: ACTIVE | Noted: 2020-09-24

## 2021-01-12 PROBLEM — N18.31 STAGE 3A CHRONIC KIDNEY DISEASE (HCC): Status: ACTIVE | Noted: 2021-01-12

## 2021-07-20 PROBLEM — I62.03 CHRONIC SUBDURAL HEMATOMA (HCC): Status: ACTIVE | Noted: 2021-06-14

## 2021-07-20 PROBLEM — I51.89 DIASTOLIC DYSFUNCTION: Status: ACTIVE | Noted: 2020-07-10

## 2021-07-20 PROBLEM — M1A.3790: Status: ACTIVE | Noted: 2020-07-10

## 2021-07-20 PROBLEM — R41.3 MEMORY DEFICITS: Status: ACTIVE | Noted: 2020-02-28

## 2021-07-20 PROBLEM — Z91.81 AT HIGH RISK FOR FALLS: Status: ACTIVE | Noted: 2021-07-20

## 2021-07-20 PROBLEM — I51.7 LVH (LEFT VENTRICULAR HYPERTROPHY): Status: ACTIVE | Noted: 2021-07-20

## 2021-07-20 PROBLEM — R47.1 DYSARTHRIA: Status: ACTIVE | Noted: 2020-06-18

## 2021-11-23 ENCOUNTER — APPOINTMENT (OUTPATIENT)
Dept: CT IMAGING | Age: 81
End: 2021-11-23
Attending: EMERGENCY MEDICINE
Payer: MEDICARE

## 2021-11-23 ENCOUNTER — HOSPITAL ENCOUNTER (EMERGENCY)
Age: 81
Discharge: OTHER INSTITUTION | End: 2021-11-24
Attending: EMERGENCY MEDICINE
Payer: MEDICARE

## 2021-11-23 DIAGNOSIS — S01.81XA FOREHEAD LACERATION, INITIAL ENCOUNTER: Primary | ICD-10-CM

## 2021-11-23 DIAGNOSIS — S09.90XA CLOSED HEAD INJURY, INITIAL ENCOUNTER: ICD-10-CM

## 2021-11-23 PROCEDURE — 90471 IMMUNIZATION ADMIN: CPT

## 2021-11-23 PROCEDURE — 99284 EMERGENCY DEPT VISIT MOD MDM: CPT

## 2021-11-23 PROCEDURE — 75810000293 HC SIMP/SUPERF WND  RPR

## 2021-11-24 ENCOUNTER — APPOINTMENT (OUTPATIENT)
Dept: CT IMAGING | Age: 81
End: 2021-11-24
Attending: EMERGENCY MEDICINE
Payer: MEDICARE

## 2021-11-24 VITALS
RESPIRATION RATE: 18 BRPM | SYSTOLIC BLOOD PRESSURE: 145 MMHG | HEIGHT: 72 IN | BODY MASS INDEX: 26.55 KG/M2 | OXYGEN SATURATION: 100 % | HEART RATE: 78 BPM | WEIGHT: 195.99 LBS | TEMPERATURE: 98.1 F | DIASTOLIC BLOOD PRESSURE: 75 MMHG

## 2021-11-24 PROCEDURE — 72125 CT NECK SPINE W/O DYE: CPT

## 2021-11-24 PROCEDURE — 90715 TDAP VACCINE 7 YRS/> IM: CPT | Performed by: EMERGENCY MEDICINE

## 2021-11-24 PROCEDURE — 74011250636 HC RX REV CODE- 250/636: Performed by: EMERGENCY MEDICINE

## 2021-11-24 PROCEDURE — 70450 CT HEAD/BRAIN W/O DYE: CPT

## 2021-11-24 PROCEDURE — 70486 CT MAXILLOFACIAL W/O DYE: CPT

## 2021-11-24 PROCEDURE — 75810000293 HC SIMP/SUPERF WND  RPR

## 2021-11-24 RX ADMIN — TETANUS TOXOID, REDUCED DIPHTHERIA TOXOID AND ACELLULAR PERTUSSIS VACCINE, ADSORBED 0.5 ML: 5; 2.5; 8; 8; 2.5 SUSPENSION INTRAMUSCULAR at 01:46

## 2021-11-24 NOTE — ED TRIAGE NOTES
Pt had mechanical fall from standing. Pt has has small lac on nose and above left eye. Pt denies LOC and blood thinners. SNF staff states pt is on coumadin but there is no record of this. Bleeding controlled.

## 2021-11-24 NOTE — ED NOTES
I have reviewed discharge instructions with the caregiver. The caregiver verbalized understanding. Patient left ED via Discharge Method: stretcher to SNF with transport    Opportunity for questions and clarification provided. Patient given 0 scripts. To continue your aftercare when you leave the hospital, you may receive an automated call from our care team to check in on how you are doing. This is a free service and part of our promise to provide the best care and service to meet your aftercare needs.  If you have questions, or wish to unsubscribe from this service please call [phone]. Thank you for Choosing our [facility].

## 2021-11-24 NOTE — DISCHARGE INSTRUCTIONS
Suture removal in 5 days  Keep wound clean and dry.   Apply small amount of Neosporin 2-3 times a day    Monitor for redness, swelling, drainage or bleeding    Recheck with primary care doctor    Continue all current medications    Return to ER for any worsening symptoms or new problems which may arise

## 2021-11-24 NOTE — ED TRIAGE NOTES
Pt came from nursing facility. He fell, didn't lose consiousness. No blood thinners. AAO x 3, baseline dementia.

## 2021-11-24 NOTE — ED PROVIDER NOTES
79-year-old male patient from his skilled nursing facility after a fall this evening. Patient states he \"took a flyer\"  Patient did sustain a laceration to his forehead and nasal area. He does have a history of Parkinson's disease as well as some degree of dementia  He does not complain of pain at this time. The history is provided by the patient and the EMS personnel. Fall  The accident occurred 1 to 2 hours ago. The fall occurred while standing. He fell from a height of ground level. He landed on hard floor. The volume of blood lost was minimal. The point of impact was the head. The pain is present in the head. The pain is mild. He was not ambulatory at the scene. There was no entrapment after the fall. There was no drug use involved in the accident. There was no alcohol use involved in the accident. Associated symptoms include laceration. Pertinent negatives include no fever, no numbness, no abdominal pain, no nausea, no vomiting, no headaches, no extremity weakness and no loss of consciousness. The risk factors include dementia and being elderly. The symptoms are aggravated by pressure on injury. He has tried nothing for the symptoms. It is unknown when the patient last had a tetanus shot.         Past Medical History:   Diagnosis Date    AAA (abdominal aortic aneurysm) ([facility] [address] 09/12/2016    2.94 cm    Atherosclerotic plaque 09/12/2016    Bilateral Carotid Arteries    BPH with obstruction/lower urinary tract symptoms     CAD (coronary artery disease)     CKD (chronic kidney disease) stage 3, GFR 30-59 ml/min ([facility])     DDD (degenerative disc disease), cervical     Per MRI    Erectile dysfunction     HTN (hypertension)     Hypercholesterolemia     Left ventricular hypertrophy 09/12/2016    Per ECHO    Parkinson's disease ([address]     Peripheral arterial disease ([address] 09/12/2016    Nonobstructive       Past Surgical History:   Procedure Laterality Date    HX COLONOSCOPY  07/11/2017    12 mm sessile polyp - mixed tubulovillous adenoma    HX CORONARY STENT PLACEMENT  12/21/2017    Proximal RCA    HX HEART CATHETERIZATION  2009    Stent x 1    HX HEART CATHETERIZATION  12/21/2017    PCI - Proximal RCA, Distal LAD Not Amenable to PCI    HX OTHER SURGICAL  1949    Rectal Repair from Accident    HX TONSIL AND ADENOIDECTOMY           Family History:   Problem Relation Age of Onset    Stroke Father 61    Other Brother         Carotid Artery Stenosis    Alzheimer's Disease Brother     Heart Disease Mother        Social History     Socioeconomic History    Marital status:      Spouse name: Not on file    Number of children: Not on file    Years of education: Not on file    Highest education level: Not on file   Occupational History    Not on file   Tobacco Use    Smoking status: Never Smoker    Smokeless tobacco: Never Used   Vaping Use    Vaping Use: Never used   Substance and Sexual Activity    Alcohol use: Yes     Alcohol/week: 8.0 standard drinks     Types: 8 Cans of beer per week    Drug use: No    Sexual activity: Not on file   Other Topics Concern    Not on file   Social History Narrative    Not on file     Social Determinants of Health     Financial Resource Strain:     Difficulty of Paying Living Expenses: Not on file   Food Insecurity:     Worried About Running Out of Food in the Last Year: Not on file    Berna of Food in the Last Year: Not on file   Transportation Needs:     Lack of Transportation (Medical): Not on file    Lack of Transportation (Non-Medical):  Not on file   Physical Activity:     Days of Exercise per Week: Not on file    Minutes of Exercise per Session: Not on file   Stress:     Feeling of Stress : Not on file   Social Connections:     Frequency of Communication with Friends and Family: Not on file    Frequency of Social Gatherings with Friends and Family: Not on file    Attends Shinto Services: Not on file   [employer] of Clubs or Organizations: Not on file    Attends Club or Organization Meetings: Not on file    Marital Status: Not on file   Intimate Partner Violence:     Fear of Current or Ex-Partner: Not on file    Emotionally Abused: Not on file    Physically Abused: Not on file    Sexually Abused: Not on file   Housing Stability:     Unable to Pay for Housing in the Last Year: Not on file    Number of [place] in the Last Year: Not on file    Unstable Housing in the Last Year: Not on file         ALLERGIES: Patient has no known allergies. Review of Systems   Unable to perform ROS: Dementia   Constitutional: Negative for activity change, chills, diaphoresis and fever. HENT: Negative for dental problem, hearing loss, nosebleeds, rhinorrhea and sore throat. Eyes: Negative for pain, discharge, redness and visual disturbance. Respiratory: Negative for cough, chest tightness and shortness of breath. Cardiovascular: Negative for chest pain, palpitations and leg swelling. Gastrointestinal: Negative for abdominal pain, constipation, diarrhea, nausea and vomiting. Endocrine: Negative for cold intolerance, heat intolerance, polydipsia and polyuria. Genitourinary: Negative for dysuria and flank pain. Musculoskeletal: Positive for arthralgias and gait problem. Negative for back pain, extremity weakness, joint swelling, myalgias and neck pain. Skin: Negative for pallor and rash. Allergic/Immunologic: Negative for environmental allergies and food allergies. Neurological: Positive for tremors and weakness. Negative for dizziness, loss of consciousness, light-headedness, numbness and headaches. Hematological: Negative for adenopathy. Does not bruise/bleed easily. Psychiatric/Behavioral: Negative for confusion and dysphoric mood. The patient is not nervous/anxious and is not hyperactive. All other systems reviewed and are negative.       Vitals:    11/23/21 2331 11/23/21 2332   BP:  (!) 155/81   Pulse:  76 Resp:  18   Temp:  97.9 °F (36.6 °C)   SpO2:  99%   Weight: 88.9 kg (195 lb 15.8 oz)    Height: 6' (1.829 m)             Physical Exam  Vitals and nursing note reviewed. Constitutional:       General: He is in acute distress. Appearance: Normal appearance. He is well-developed and normal weight. HENT:      Head: Normocephalic. Comments: No step-off or deformity around the left eyebrow/orbital region     Right Ear: External ear normal.      Left Ear: External ear normal.      Mouth/Throat:      Pharynx: No oropharyngeal exudate. Eyes:      General: No scleral icterus. Conjunctiva/sclera: Conjunctivae normal.      Pupils: Pupils are equal, round, and reactive to light. Neck:      Thyroid: No thyromegaly. Vascular: No JVD. Cardiovascular:      Rate and Rhythm: Normal rate and regular rhythm. Pulses: Normal pulses. Heart sounds: Normal heart sounds. No murmur heard. No friction rub. No gallop. Pulmonary:      Effort: Pulmonary effort is normal. No respiratory distress. Breath sounds: Normal breath sounds. No wheezing. Abdominal:      General: Bowel sounds are normal. There is no distension. Palpations: Abdomen is soft. Tenderness: There is no abdominal tenderness. Musculoskeletal:         General: No tenderness, deformity or signs of injury. Normal range of motion. Cervical back: Normal range of motion and neck supple. Comments: Range of motion is normal in both hips  Patient does have some mild symmetric weakness in both of his lower extremities  No evidence of injury   Skin:     General: Skin is warm and dry. Capillary Refill: Capillary refill takes less than 2 seconds. Findings: Laceration present. No rash. Neurological:      General: No focal deficit present. Mental Status: He is alert and oriented to person, place, and time. GCS: GCS eye subscore is 4. GCS verbal subscore is 5. GCS motor subscore is 6.       Cranial Nerves: Cranial nerves are intact. No cranial nerve deficit. Sensory: Sensation is intact. No sensory deficit. Motor: Motor function is intact. No abnormal muscle tone. Coordination: Coordination normal.   Psychiatric:         Mood and Affect: Mood and affect normal.         Speech: Speech normal.         Behavior: Behavior normal. Behavior is cooperative. Thought Content: Thought content normal.         Cognition and Memory: He exhibits impaired recent memory.           MDM  Number of Diagnoses or Management Options  Closed head injury, initial encounter: new and requires workup  Forehead laceration, initial encounter: new and requires workup  Diagnosis management comments: 41-year-old male with a history of Parkinson's disease and dementia presents after fall at his skilled nursing facility  Exam reveals only lacerations and abrasions to the forehead and nasal area    Patient denies any reproducible neck pain    Given mechanism we will check CT to rule out injury    Left lower forehead laceration will require suturing    1:29 AM  Wound repaired without difficulty    CT imaging does not reveal any evidence of acute injury from the fall    Patient remains awake and alert    Patient will be discharged to follow-up with his primary care doctor       Amount and/or Complexity of Data Reviewed  Tests in the radiology section of CPT®: ordered and reviewed  Tests in the medicine section of CPT®: ordered and reviewed  Decide to obtain previous medical records or to obtain history from someone other than the patient: yes  Obtain history from someone other than the patient: yes  Review and summarize past medical records: yes  Independent visualization of images, tracings, or specimens: yes    Risk of Complications, Morbidity, and/or Mortality  Presenting problems: moderate  Diagnostic procedures: moderate  Management options: moderate  General comments: Elements of this note have been dictated via voice recognition software. Text and phrases may be limited by the accuracy of the software. The chart has been reviewed, but errors may still be present. Patient Progress  Patient progress: improved         Wound Repair    Date/Time: 11/24/2021 12:10 AM  Performed by: attendingPreparation: skin prepped with Betadine  Pre-procedure re-eval: Immediately prior to the procedure, the patient was reevaluated and found suitable for the planned procedure and any planned medications. Location details: face  Wound length:2.6 - 7.5 cm  Anesthesia: local infiltration    Anesthesia:  Local Anesthetic: lidocaine 1% without epinephrine  Anesthetic total: 2 mL  Irrigation solution: saline  Irrigation method: syringe  Debridement: none  Skin closure: Prolene (5.0)  Number of sutures: 6  Technique: interrupted  Approximation: close  Dressing: antibiotic ointment  Patient tolerance: patient tolerated the procedure well with no immediate complications  My total time at bedside, performing this procedure was 1-15 minutes.